# Patient Record
Sex: MALE | Race: ASIAN | Employment: UNEMPLOYED | URBAN - METROPOLITAN AREA
[De-identification: names, ages, dates, MRNs, and addresses within clinical notes are randomized per-mention and may not be internally consistent; named-entity substitution may affect disease eponyms.]

---
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
## 2021-10-22 ENCOUNTER — OFFICE VISIT (OUTPATIENT)
Dept: PRIMARY CARE CLINIC | Age: 21
End: 2021-10-22
Payer: COMMERCIAL

## 2021-10-22 VITALS
WEIGHT: 94.8 LBS | BODY MASS INDEX: 16.18 KG/M2 | SYSTOLIC BLOOD PRESSURE: 120 MMHG | HEART RATE: 88 BPM | DIASTOLIC BLOOD PRESSURE: 66 MMHG | HEIGHT: 64 IN | OXYGEN SATURATION: 100 %

## 2021-10-22 DIAGNOSIS — G89.29 CHRONIC RLQ PAIN: Primary | ICD-10-CM

## 2021-10-22 DIAGNOSIS — R10.31 CHRONIC RLQ PAIN: Primary | ICD-10-CM

## 2021-10-22 PROCEDURE — 99214 OFFICE O/P EST MOD 30 MIN: CPT | Performed by: PHYSICIAN ASSISTANT

## 2021-10-22 SDOH — ECONOMIC STABILITY: FOOD INSECURITY: WITHIN THE PAST 12 MONTHS, YOU WORRIED THAT YOUR FOOD WOULD RUN OUT BEFORE YOU GOT MONEY TO BUY MORE.: NEVER TRUE

## 2021-10-22 SDOH — ECONOMIC STABILITY: FOOD INSECURITY: WITHIN THE PAST 12 MONTHS, THE FOOD YOU BOUGHT JUST DIDN'T LAST AND YOU DIDN'T HAVE MONEY TO GET MORE.: NEVER TRUE

## 2021-10-22 ASSESSMENT — ENCOUNTER SYMPTOMS
NAUSEA: 0
BLOOD IN STOOL: 0
RESPIRATORY NEGATIVE: 1
ABDOMINAL PAIN: 1
CONSTIPATION: 0
ANAL BLEEDING: 0
ABDOMINAL DISTENTION: 0
VOMITING: 0
DIARRHEA: 0

## 2021-10-22 ASSESSMENT — PATIENT HEALTH QUESTIONNAIRE - PHQ9
1. LITTLE INTEREST OR PLEASURE IN DOING THINGS: 0
SUM OF ALL RESPONSES TO PHQ QUESTIONS 1-9: 0
SUM OF ALL RESPONSES TO PHQ9 QUESTIONS 1 & 2: 0
2. FEELING DOWN, DEPRESSED OR HOPELESS: 0

## 2021-10-22 ASSESSMENT — SOCIAL DETERMINANTS OF HEALTH (SDOH): HOW HARD IS IT FOR YOU TO PAY FOR THE VERY BASICS LIKE FOOD, HOUSING, MEDICAL CARE, AND HEATING?: NOT HARD AT ALL

## 2021-10-22 NOTE — PROGRESS NOTES
[facility]  [address] [place] [id number]  Phone: [phone]  Fax: [phone]    Laurie Clemente is a 22 y.o. male who presents today for his medical conditions/complaintsas noted below. Chief Complaint   Patient presents with    Abdominal Pain     RLQ abdominal pain x 2 weeks. Pain is intermittent. Patient notices pain when he moves to stand up.  Eye Problem     Patient complains of left eye yellow spot. No pain or discharge          HPI:     HPI  Off and on abdominal pain for 2 weeks. Slowly getting better. Has not had N/V/fever, constipation/diarrhea/rash. Has had loud bowel sounds. Last BM was normal today   No current outpatient medications on file. No current facility-administered medications for this visit. No Known Allergies    Subjective:      Review of Systems   Constitutional: Negative for chills, diaphoresis and fever. Eyes:        Has a yellow spot in eye   Respiratory: Negative. Cardiovascular: Negative. Gastrointestinal: Positive for abdominal pain (RLQ). Negative for abdominal distention, anal bleeding, blood in stool, constipation, diarrhea, nausea and vomiting. Genitourinary: Negative for hematuria. Skin: Negative for rash. Objective:     /66   Pulse 88   Ht 5' 3.5\" (1.613 m)   Wt 94 lb 12.8 oz (43 kg)   SpO2 100%   BMI 16.53 kg/m²   Physical Exam  Vitals and nursing note reviewed. Constitutional:       Appearance: Normal appearance. He is not ill-appearing. Comments: Patient is underweight   Eyes:      Extraocular Movements:      Right eye: Nystagmus present. Left eye: Nystagmus present. Comments: Light brown irregularly shaped macule on sclera   Cardiovascular:      Rate and Rhythm: Normal rate and regular rhythm. Heart sounds: Normal heart sounds. Pulmonary:      Effort: Pulmonary effort is normal.      Breath sounds: Normal breath sounds. No wheezing, rhonchi or rales. Abdominal:      General: Abdomen is flat. Bowel sounds are increased. Palpations: There is no fluid wave, hepatomegaly, splenomegaly or mass. Tenderness: There is no abdominal tenderness. There is no guarding or rebound. Hernia: No hernia is present. Neurological:      Mental Status: He is alert. Assessment:       Diagnosis Orders   1. Chronic RLQ pain  CT ABDOMEN PELVIS W IV CONTRAST    BUN    Creatinine        Plan:     See eye doctor about light brown spot on inner left eye  CT scan for abdominal pain  Tylenol for pain, if needed  If any N/V, fever go to ER. Return if symptoms worsen or fail to improve. Orders Placed This Encounter   Procedures    CT ABDOMEN PELVIS W IV CONTRAST     Standing Status:   Future     Standing Expiration Date:   10/22/2022    BUN     Standing Status:   Future     Standing Expiration Date:   10/22/2022    Creatinine     Standing Status:   Future     Standing Expiration Date:   10/22/2022     No orders of the defined types were placed in this encounter.           Electronically signed by Rod Nyhan, PA-Con 10/22/2021 at 3:26 PM

## 2021-11-13 ENCOUNTER — HOSPITAL ENCOUNTER (OUTPATIENT)
Dept: CT IMAGING | Age: 21
Discharge: HOME | End: 2021-11-15
Payer: COMMERCIAL

## 2021-11-13 DIAGNOSIS — G89.29 CHRONIC RLQ PAIN: ICD-10-CM

## 2021-11-13 DIAGNOSIS — R10.31 CHRONIC RLQ PAIN: ICD-10-CM

## 2021-11-13 PROCEDURE — 6360000004 HC RX CONTRAST MEDICATION: Performed by: PHYSICIAN ASSISTANT

## 2021-11-13 PROCEDURE — 2580000003 HC RX 258: Performed by: PHYSICIAN ASSISTANT

## 2021-11-13 PROCEDURE — 74177 CT ABD & PELVIS W/CONTRAST: CPT

## 2021-11-13 RX ORDER — 0.9 % SODIUM CHLORIDE 0.9 %
80 INTRAVENOUS SOLUTION INTRAVENOUS ONCE
Status: COMPLETED | OUTPATIENT
Start: 2021-11-13 | End: 2021-11-13

## 2021-11-13 RX ORDER — SODIUM CHLORIDE 0.9 % (FLUSH) 0.9 %
10 SYRINGE (ML) INJECTION PRN
Status: DISCONTINUED | OUTPATIENT
Start: 2021-11-13 | End: 2021-11-16 | Stop reason: HOSPADM

## 2021-11-13 RX ADMIN — IOHEXOL 50 ML: 240 INJECTION, SOLUTION INTRATHECAL; INTRAVASCULAR; INTRAVENOUS; ORAL at 14:25

## 2021-11-13 RX ADMIN — IOPAMIDOL 75 ML: 755 INJECTION, SOLUTION INTRAVENOUS at 14:26

## 2021-11-13 RX ADMIN — SODIUM CHLORIDE 80 ML: 9 INJECTION, SOLUTION INTRAVENOUS at 14:26

## 2021-11-13 RX ADMIN — SODIUM CHLORIDE, PRESERVATIVE FREE 10 ML: 5 INJECTION INTRAVENOUS at 14:26

## 2021-11-16 ENCOUNTER — OFFICE VISIT (OUTPATIENT)
Dept: PRIMARY CARE CLINIC | Age: 21
End: 2021-11-16
Payer: COMMERCIAL

## 2021-11-16 VITALS
HEIGHT: 64 IN | DIASTOLIC BLOOD PRESSURE: 70 MMHG | SYSTOLIC BLOOD PRESSURE: 100 MMHG | OXYGEN SATURATION: 98 % | WEIGHT: 96.6 LBS | BODY MASS INDEX: 16.49 KG/M2 | HEART RATE: 72 BPM

## 2021-11-16 DIAGNOSIS — K59.00 CONSTIPATION, UNSPECIFIED CONSTIPATION TYPE: Primary | ICD-10-CM

## 2021-11-16 PROCEDURE — 99213 OFFICE O/P EST LOW 20 MIN: CPT | Performed by: PHYSICIAN ASSISTANT

## 2021-11-16 ASSESSMENT — ENCOUNTER SYMPTOMS
COUGH: 0
ABDOMINAL PAIN: 0
DIARRHEA: 0
RHINORRHEA: 0
SINUS PRESSURE: 0
PHOTOPHOBIA: 0
CONSTIPATION: 0
VOMITING: 0
ABDOMINAL DISTENTION: 0
EYE DISCHARGE: 0
SORE THROAT: 0
SHORTNESS OF BREATH: 0
CHEST TIGHTNESS: 0

## 2021-11-16 NOTE — PROGRESS NOTES
[address] [place] [address] PRIMARY CARE  [address] [facility] [address]  Dept: [phone]    Daniel Pastrana is a 22 y.o. male Established patient, who presents today for his medical conditions/complaints as noted below. Chief Complaint   Patient presents with    Follow-up     f/u CT scan       HPI:     HPI: The patient has been having abdominal pain for months. Had a CT completed which was normal. Put ointment on abd which did not help. Last BM was this morning. They are soft and formed. Pain on right lower side. Reviewed prior notes None  Reviewed previous Labs    No results found for: LDLCHOLESTEROL, LDLCALC    (goal LDL is <100)   No results found for: AST, ALT, BUN, LABA1C, TSH  BP Readings from Last 3 Encounters:   11/16/21 100/70   10/22/21 120/66   09/18/20 100/60          (goal 120/80)    Past Medical History:   Diagnosis Date    Prematurity     Short gut syndrome       Past Surgical History:   Procedure Laterality Date    SMALL INTESTINE SURGERY      Had to have five surgeries        History reviewed. No pertinent family history. Social History     Tobacco Use    Smoking status: Never Smoker    Smokeless tobacco: Never Used   Substance Use Topics    Alcohol use: Never      Current Outpatient Medications   Medication Sig Dispense Refill    magnesium citrate solution Take 148 mLs by mouth once for 1 dose 148 mL 0     No current facility-administered medications for this visit.      No Known Allergies    Health Maintenance   Topic Date Due    Hepatitis C screen  Never done    Varicella vaccine (1 of 2 - 2-dose childhood series) Never done    Hepatitis B vaccine (2 of 3 - 3-dose primary series) 04/01/2002    HPV vaccine (1 - Male 2-dose series) Never done    HIV screen  Never done    Flu vaccine (1) 11/16/2022 (Originally 9/1/2021)    DTaP/Tdap/Td vaccine (3 - Td or Tdap) 03/27/2029 (Originally 3/14/2024)    Hib vaccine  Completed    Meningococcal bruit. Cardiovascular:      Rate and Rhythm: Normal rate and regular rhythm. Pulses: Normal pulses. Heart sounds: Normal heart sounds. Pulmonary:      Effort: Pulmonary effort is normal. No respiratory distress. Breath sounds: Normal breath sounds. Abdominal:      General: Bowel sounds are normal. There is no distension. Tenderness: There is no abdominal tenderness. Musculoskeletal:         General: Normal range of motion. Cervical back: Normal range of motion and neck supple. Lymphadenopathy:      Cervical: No cervical adenopathy. Skin:     General: Skin is warm and dry. Neurological:      General: No focal deficit present. Mental Status: He is alert and oriented to person, place, and time. Psychiatric:         Mood and Affect: Mood normal.         Behavior: Behavior normal.         Thought Content: Thought content normal.         Assessment and Plan:          1. Constipation, unspecified constipation type  -     magnesium citrate solution; Take 148 mLs by mouth once for 1 dose, Disp-148 mL, R-0Normal             Patient given educational materials - see patient instructions. Discussed use, benefit, and side effects of prescribed medications. All patient questions answered. Pt voiced understanding. Reviewed health maintenance. Instructed to continue current medications, diet and exercise. Patient agreed with treatment plan. Follow up as directed.      Electronically signed by GEORGE Eldridge on 11/16/2021 at 11:12 AM

## 2022-01-04 ENCOUNTER — TELEPHONE (OUTPATIENT)
Dept: PRIMARY CARE CLINIC | Age: 22
End: 2022-01-04

## 2022-01-04 NOTE — TELEPHONE ENCOUNTER
Pts dad called in. Pt has a cough and his dad tested positive for covid, he was asking if pt should get tested as well. Ok to schedule for covid swab [place]?

## 2022-01-04 NOTE — TELEPHONE ENCOUNTER
Yes, ok to have [place] for covid swab or they can go to [facility]

## 2022-01-05 ENCOUNTER — NURSE ONLY (OUTPATIENT)
Dept: PRIMARY CARE CLINIC | Age: 22
End: 2022-01-05

## 2022-01-05 ENCOUNTER — HOSPITAL ENCOUNTER (OUTPATIENT)
Age: 22
Setting detail: SPECIMEN
Discharge: HOME | End: 2022-01-05

## 2022-01-05 DIAGNOSIS — Z11.52 ENCOUNTER FOR SCREENING FOR COVID-19: Primary | ICD-10-CM

## 2022-01-05 NOTE — PROGRESS NOTES
Per orders from Dr Larry Larose, patient came in for a [place] to get a covid swab. Patient c/o headaches, fatigue and body aches x 1 day. Covid swab was obtained and patient tolerated well. Patient was informed that we will call him once we get the results. Verbal understanding per patient.

## 2022-01-06 DIAGNOSIS — Z11.52 ENCOUNTER FOR SCREENING FOR COVID-19: ICD-10-CM

## 2022-01-06 LAB
SARS-COV-2: ABNORMAL
SARS-COV-2: DETECTED
SOURCE: ABNORMAL

## 2023-05-12 ENCOUNTER — HOSPITAL ENCOUNTER (OUTPATIENT)
Age: 23
Discharge: HOME | End: 2023-05-12
Payer: COMMERCIAL

## 2023-05-12 ENCOUNTER — HOSPITAL ENCOUNTER (OUTPATIENT)
Dept: GENERAL RADIOLOGY | Age: 23
End: 2023-05-12
Payer: COMMERCIAL

## 2023-05-12 ENCOUNTER — OFFICE VISIT (OUTPATIENT)
Dept: PRIMARY CARE CLINIC | Age: 23
End: 2023-05-12
Payer: COMMERCIAL

## 2023-05-12 VITALS
TEMPERATURE: 98 F | SYSTOLIC BLOOD PRESSURE: 110 MMHG | HEIGHT: 64 IN | OXYGEN SATURATION: 98 % | WEIGHT: 95 LBS | DIASTOLIC BLOOD PRESSURE: 68 MMHG | BODY MASS INDEX: 16.22 KG/M2 | HEART RATE: 76 BPM

## 2023-05-12 DIAGNOSIS — R10.31 RIGHT LOWER QUADRANT ABDOMINAL PAIN: Primary | ICD-10-CM

## 2023-05-12 DIAGNOSIS — R10.31 RIGHT LOWER QUADRANT ABDOMINAL PAIN: ICD-10-CM

## 2023-05-12 PROCEDURE — 99213 OFFICE O/P EST LOW 20 MIN: CPT | Performed by: PHYSICIAN ASSISTANT

## 2023-05-12 PROCEDURE — 74019 RADEX ABDOMEN 2 VIEWS: CPT

## 2023-05-12 ASSESSMENT — PATIENT HEALTH QUESTIONNAIRE - PHQ9
SUM OF ALL RESPONSES TO PHQ QUESTIONS 1-9: 0
SUM OF ALL RESPONSES TO PHQ QUESTIONS 1-9: 0
SUM OF ALL RESPONSES TO PHQ9 QUESTIONS 1 & 2: 0
SUM OF ALL RESPONSES TO PHQ QUESTIONS 1-9: 0
2. FEELING DOWN, DEPRESSED OR HOPELESS: 0
SUM OF ALL RESPONSES TO PHQ QUESTIONS 1-9: 0
1. LITTLE INTEREST OR PLEASURE IN DOING THINGS: 0

## 2023-05-12 ASSESSMENT — ENCOUNTER SYMPTOMS
ABDOMINAL DISTENTION: 0
VOMITING: 0
NAUSEA: 0
BACK PAIN: 0
BLOOD IN STOOL: 0
ANAL BLEEDING: 0
SHORTNESS OF BREATH: 0
DIARRHEA: 0
ABDOMINAL PAIN: 1
CONSTIPATION: 0

## 2023-05-12 NOTE — PROGRESS NOTES
sign and McBurney's sign. Hernia: No hernia is present. Neurological:      Mental Status: He is alert. Assessment:       Diagnosis Orders   1. Right lower quadrant abdominal pain  XR ABDOMEN (2 VIEWS)           Plan:    XR to r/o constipation  Did advise that if fever or vomiting starts, he should go to ER  Return if symptoms worsen or fail to improve. Orders Placed This Encounter   Procedures    XR ABDOMEN (2 VIEWS)     Flat plate and upright series     Standing Status:   Future     Standing Expiration Date:   5/11/2024     No orders of the defined types were placed in this encounter.           Electronically signed by Srinivas Garcia 5/12/2023 at 8:40 AM

## 2023-09-06 ENCOUNTER — OFFICE VISIT (OUTPATIENT)
Dept: PRIMARY CARE CLINIC | Age: 23
End: 2023-09-06
Payer: COMMERCIAL

## 2023-09-06 VITALS
SYSTOLIC BLOOD PRESSURE: 114 MMHG | OXYGEN SATURATION: 100 % | DIASTOLIC BLOOD PRESSURE: 80 MMHG | WEIGHT: 94 LBS | HEIGHT: 63 IN | HEART RATE: 73 BPM | BODY MASS INDEX: 16.66 KG/M2

## 2023-09-06 DIAGNOSIS — R20.9 SKIN SENSATION DISTURBANCE: Primary | ICD-10-CM

## 2023-09-06 PROCEDURE — 99213 OFFICE O/P EST LOW 20 MIN: CPT | Performed by: PHYSICIAN ASSISTANT

## 2023-09-06 SDOH — ECONOMIC STABILITY: FOOD INSECURITY: WITHIN THE PAST 12 MONTHS, YOU WORRIED THAT YOUR FOOD WOULD RUN OUT BEFORE YOU GOT MONEY TO BUY MORE.: NEVER TRUE

## 2023-09-06 SDOH — ECONOMIC STABILITY: FOOD INSECURITY: WITHIN THE PAST 12 MONTHS, THE FOOD YOU BOUGHT JUST DIDN'T LAST AND YOU DIDN'T HAVE MONEY TO GET MORE.: NEVER TRUE

## 2023-09-06 SDOH — ECONOMIC STABILITY: INCOME INSECURITY: HOW HARD IS IT FOR YOU TO PAY FOR THE VERY BASICS LIKE FOOD, HOUSING, MEDICAL CARE, AND HEATING?: NOT VERY HARD

## 2023-09-06 SDOH — ECONOMIC STABILITY: HOUSING INSECURITY
IN THE LAST 12 MONTHS, WAS THERE A TIME WHEN YOU DID NOT HAVE A STEADY PLACE TO SLEEP OR SLEPT IN A SHELTER (INCLUDING NOW)?: NO

## 2023-09-06 ASSESSMENT — ENCOUNTER SYMPTOMS
CHEST TIGHTNESS: 0
COUGH: 0
SHORTNESS OF BREATH: 0
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
## 2024-09-04 ENCOUNTER — OFFICE VISIT (OUTPATIENT)
Dept: PRIMARY CARE CLINIC | Age: 24
End: 2024-09-04

## 2024-09-04 VITALS
WEIGHT: 97.2 LBS | DIASTOLIC BLOOD PRESSURE: 68 MMHG | HEIGHT: 63 IN | SYSTOLIC BLOOD PRESSURE: 100 MMHG | BODY MASS INDEX: 17.22 KG/M2 | HEART RATE: 80 BPM | OXYGEN SATURATION: 99 %

## 2024-09-04 DIAGNOSIS — L72.3 INFLAMED SEBACEOUS CYST: Primary | ICD-10-CM

## 2024-09-04 PROCEDURE — 99213 OFFICE O/P EST LOW 20 MIN: CPT | Performed by: PHYSICIAN ASSISTANT

## 2024-09-04 ASSESSMENT — ENCOUNTER SYMPTOMS
CHEST TIGHTNESS: 0
COUGH: 0
ABDOMINAL PAIN: 0
SORE THROAT: 0
CONSTIPATION: 0
ABDOMINAL DISTENTION: 0
DIARRHEA: 0
SHORTNESS OF BREATH: 0

## 2024-09-04 ASSESSMENT — PATIENT HEALTH QUESTIONNAIRE - PHQ9
SUM OF ALL RESPONSES TO PHQ QUESTIONS 1-9: 0
SUM OF ALL RESPONSES TO PHQ QUESTIONS 1-9: 0
SUM OF ALL RESPONSES TO PHQ9 QUESTIONS 1 & 2: 0
1. LITTLE INTEREST OR PLEASURE IN DOING THINGS: NOT AT ALL
2. FEELING DOWN, DEPRESSED OR HOPELESS: NOT AT ALL
SUM OF ALL RESPONSES TO PHQ QUESTIONS 1-9: 0
SUM OF ALL RESPONSES TO PHQ QUESTIONS 1-9: 0

## 2024-09-04 NOTE — PROGRESS NOTES
Skin:     General: Skin is warm and dry.      Findings: Lesion present. No erythema.   Neurological:      Mental Status: He is alert and oriented to person, place, and time.   Psychiatric:         Mood and Affect: Mood normal.         Behavior: Behavior normal.         Thought Content: Thought content normal.         Assessment and Plan:     Assessment & Plan     1. Inflamed sebaceous cyst  -     Derek Maloney MD, General Surgery, [place]     20 min spent going over this diagnosis and treatment with patient.           No follow-ups on file.     Patient given educational materials - see patient instructions.  Discussed use, benefit, and side effects of prescribed medications.  All patient questions answered. Pt voiced understanding. Reviewed health maintenance.  Instructed to continue current medications, diet and exercise.  Patient agreed with treatment plan. Follow up as directed.     Electronically signed by GEORGE Matthew on 9/4/2024 at 8:20 AM

## 2024-09-29 NOTE — PROGRESS NOTES
risk-benefit alternatives, recovery restrictions were all discussed with the patient at length.  Pre-operative, intra-operative, post-operative details regarding procedure discussed with patient and all questions were answered. Patient understands and wants to proceed.    ENCOUNTER DIAGNOSES    ICD-10-CM    1. Epidermal cyst  L72.0           Return for Post Op surgery.    The patient, Tom Adams is a 24 y.o. male, was seen with a total time spent of 35 minutes for the visit on this date of service by the E/M provider. The time component had both face to face and non face to face time spent in determining the total time component.  Counseling and education regarding the patient's diagnosis listed below and the patient's options regarding those diagnoses were also included in determining the time component.      Electronically signed by CORETTA Chambers CNP  on 10/3/2024 at 2:34 PM

## 2024-09-30 ENCOUNTER — OFFICE VISIT (OUTPATIENT)
Age: 24
End: 2024-09-30
Payer: COMMERCIAL

## 2024-09-30 VITALS
WEIGHT: 96.6 LBS | HEIGHT: 65 IN | HEART RATE: 71 BPM | BODY MASS INDEX: 16.1 KG/M2 | TEMPERATURE: 98.6 F | SYSTOLIC BLOOD PRESSURE: 124 MMHG | DIASTOLIC BLOOD PRESSURE: 81 MMHG

## 2024-09-30 DIAGNOSIS — L72.0 EPIDERMAL CYST: Primary | ICD-10-CM

## 2024-09-30 PROCEDURE — 99203 OFFICE O/P NEW LOW 30 MIN: CPT | Performed by: NURSE PRACTITIONER

## 2024-09-30 NOTE — PATIENT INSTRUCTIONS
[facility] GENERAL SURGERY   [id number]  [facility] [address]  Dept: [phone]  Dept Fax: [phone]          SURGICAL REQUIREMENTS :    - If you have any coverage / billing questions before your surgery please contact your insurance directly.   - As discussed with the surgery scheduler and/ or your provider, surgical clearances may be required for your surgical procedure. We will let you know if this is needed at the time of your consult.  -Pre- admission testing is required for all surgeries either by phone or in person depending on the type of surgery you are having.   - Please note pre-admission testing may require surgical clearance per their guidelines.  -Our surgery schedulers will schedule this for you and provide you with the date, time and if it is in person or over the phone.   -Our surgery schedulers will also provide you with an appointment date and time for follow up in our office 2-3 weeks after your procedure.     -If surgical clearance is required, a letter requesting this will be faxed to your provider(s).   Please follow up with your provider(s) to schedule the necessary appointments needed to obtain the clearance.    -Please note a clearance can be required through pre-admission testing or any provider that takes part in your care. Required clearances not obtained prior to surgery may result in cancellation or rescheduling of your surgery.    -Any FMLA, short term disability paperwork or work notes needed will not be completed until the day of your scheduled surgery, please allow 24-48 hours for these to be completed and signed by provider.     [facility].

## 2024-10-07 ENCOUNTER — TELEPHONE (OUTPATIENT)
Age: 24
End: 2024-10-07

## 2024-10-07 NOTE — TELEPHONE ENCOUNTER
Patient called to schedule procedure  Pre op instructions discussed and will be mailed to patient    EM/SC/10-28-24@2:30am/Excision of Sebaceous Cyst Left Foream/[place]               PRE SURGERY INSTRUCTIONS    STOP ASPIRIN 7 DAYS PRIOR TO PROCEDURE    STOP ALL OTHER BLOOD THINNERS 5 DAYS PRIOR TO PROCEDURE    NOTHING TO EAT, DRINK, SMOKE, OR CHEW AFTER MIDNIGHT  You may brush your teeth, rinse gargle, and spit  Heart or blood pressure medication ONLY with a  small sip of water, unless otherwise directed  Shower with regular soap and water    YOU MUST HAVE AN ADULT EITHER DRIVE YOU OR RIDE IN A CAB WITH YOU    MY PROCEDURE IS SCHEDULED FOR:  Excision of Sebaceous Cyst Left Forearm    Pre-Admission Testing Phone Assessment:10/14/24 at 11:00am    Date of Procedure: 10/28/24    Time: 2:30pm    Arrival Time: 12:30pm    Location:  [facility]     Follow Up Appointment at [facility] Dr Castro:  11/13/24 at 10:15am

## 2024-10-14 ENCOUNTER — HOSPITAL ENCOUNTER (OUTPATIENT)
Dept: PREADMISSION TESTING | Age: 24
Discharge: HOME | End: 2024-10-18

## 2024-10-14 VITALS — HEIGHT: 65 IN | WEIGHT: 96 LBS | BODY MASS INDEX: 15.99 KG/M2

## 2024-10-14 NOTE — PROGRESS NOTES
Pre-op Instructions For Out-Patient Surgery (LOCAL)    Medication Instructions:  Please stop herbs and any supplements now (includes vitamins and minerals).    Please contact your surgeon and prescribing physician for pre-op instructions for any blood thinners.    If you have inhalers/aerosol treatments at home, please use them the morning of your surgery and bring the inhalers with you to the hospital.    Please take the following medications the morning of your surgery with a sip of water:    None    Surgery Instructions:  After midnight before surgery:  Do not eat or drink anything, including water, mints, gum, and hard candy.  You may brush your teeth without swallowing.  No smoking, chewing tobacco, or street drugs.    Please shower or bathe before surgery.  If you were given Surgical Scrub Chlorhexidine Gluconate Liquid (CHG), please shower the night before and the morning of your surgery following the detailed instructions you received during your pre-admission visit.     Please do not wear any cologne, lotion, powder, deodorant, jewelry, piercings, perfume, makeup, nail polish, hair accessories, or hair spray on the day of surgery.  Wear loose comfortable clothing.    Leave your valuables at home but bring a payment source for any after-surgery prescriptions you plan to fill at [facility].  Bring a storage case for any glasses/contacts.    An adult who is responsible for you MUST drive you home and should be with you for the first 24 hours after surgery.     If having out-patient knee and foot surgeries, please arrange for planned crutches, walker, or wheelchair before arriving to the hospital.    The Day of Surgery:  Arrive at [facility] Surgery Entrance at the time directed by your surgeon and check in at the desk.     If you have a living will or healthcare power of , please bring a copy.    You will be taken to the pre-op holding area where you

## 2024-10-25 NOTE — PRE-PROCEDURE INSTRUCTIONS
Nothing to eat after midnight.  Are you taking any blood thinners? When was the last day?  Make sure to use Hibiclens prior to surgery.  Remove any jewelry and body piercings.  Do you wear glasses? If so, please bring a case to store them in.  Are you having any Covid symptoms?  Do you have any new rashes, infections, etc. that we should be aware of?  Do you have a ride home the day of surgery? It cannot be a cab or medical transportation.  Verify surgery time and what time to arrive at hospital.  NO ANSWER, VM LEFT

## 2024-10-28 ENCOUNTER — HOSPITAL ENCOUNTER (OUTPATIENT)
Age: 24
Setting detail: OUTPATIENT SURGERY
Discharge: HOME | End: 2024-10-28
Attending: SURGERY | Admitting: SURGERY
Payer: COMMERCIAL

## 2024-10-28 VITALS
RESPIRATION RATE: 14 BRPM | HEIGHT: 65 IN | OXYGEN SATURATION: 99 % | WEIGHT: 96 LBS | HEART RATE: 75 BPM | TEMPERATURE: 97.7 F | SYSTOLIC BLOOD PRESSURE: 128 MMHG | DIASTOLIC BLOOD PRESSURE: 72 MMHG | BODY MASS INDEX: 15.99 KG/M2

## 2024-10-28 DIAGNOSIS — L72.0 EPIDERMAL CYST: ICD-10-CM

## 2024-10-28 PROCEDURE — 11402 EXC TR-EXT B9+MARG 1.1-2 CM: CPT | Performed by: SURGERY

## 2024-10-28 PROCEDURE — 2709999900 HC NON-CHARGEABLE SUPPLY: Performed by: SURGERY

## 2024-10-28 PROCEDURE — 6360000002 HC RX W HCPCS: Performed by: SURGERY

## 2024-10-28 PROCEDURE — 7100000010 HC PHASE II RECOVERY - FIRST 15 MIN: Performed by: SURGERY

## 2024-10-28 PROCEDURE — 3600000012 HC SURGERY LEVEL 2 ADDTL 15MIN: Performed by: SURGERY

## 2024-10-28 PROCEDURE — 88305 TISSUE EXAM BY PATHOLOGIST: CPT

## 2024-10-28 PROCEDURE — 3600000002 HC SURGERY LEVEL 2 BASE: Performed by: SURGERY

## 2024-10-28 PROCEDURE — NBSRV NON-BILLABLE SERVICE: Performed by: NURSE PRACTITIONER

## 2024-10-28 RX ORDER — BUPIVACAINE HYDROCHLORIDE 5 MG/ML
INJECTION, SOLUTION EPIDURAL; INTRACAUDAL PRN
Status: DISCONTINUED | OUTPATIENT
Start: 2024-10-28 | End: 2024-10-28 | Stop reason: ALTCHOICE

## 2024-10-28 RX ORDER — CEPHALEXIN 500 MG/1
CAPSULE ORAL
Qty: 21 CAPSULE | Refills: 0 | Status: SHIPPED | OUTPATIENT
Start: 2024-10-28

## 2024-10-28 ASSESSMENT — PAIN - FUNCTIONAL ASSESSMENT
PAIN_FUNCTIONAL_ASSESSMENT: 0-10
PAIN_FUNCTIONAL_ASSESSMENT: NONE - DENIES PAIN

## 2024-10-28 ASSESSMENT — ENCOUNTER SYMPTOMS
TROUBLE SWALLOWING: 0
SHORTNESS OF BREATH: 0
COUGH: 0
ROS SKIN COMMENTS: SEE HPI
GASTROINTESTINAL NEGATIVE: 1
APNEA: 0
ABDOMINAL PAIN: 0
BACK PAIN: 0
SORE THROAT: 0

## 2024-10-28 NOTE — PROGRESS NOTES
[facility] Surgery   Derek Castro MD, [facility]  RADAMES Marcelino  [address], [address]  [place] [address]  P: [phone], F: [phone]    First Assist Operative Note      Patient: Tom Adams  YOB: 2000  MRN: [id number]    Date of Procedure: 10/28/2024    Pre-Op Diagnosis Codes:   Symptomatic cyst left posterior forearm     Post-Op Diagnosis: Same       Procedure(s): Excision symptomatic cyst left posterior forearm 2 x 1 cm     Surgeon(s):  Derek Castro MD    Assistant: CORETTA Chambers CNP was needed throughout the entirety of case for helped with positioning the patient, instrumentation and exposure and wound closure.     Anesthesia: Local    Detailed Description of Procedure: See Dr. Castro's operative note from 10/28/2024 for further detail.    Electronically signed by CORETTA Chambers CNP on 10/28/2024 at 4:22 PM     Class III - visualization of the soft palate and the base of the uvula

## 2024-10-28 NOTE — H&P
HISTORY and PHYSICAL  [facility]       NAME:  Tom Adams  MRN: [id number]   YOB: 2000   Date: 10/28/2024   Age: 24 y.o.  Gender: male       COMPLAINT AND PRESENT HISTORY:     Tom Adams is 24 y.o.,  male, presents for EXCISION EPIDERMAL CYST FOREARM LEFT   Primary dx: Epidermal cyst [L72.0].    Office note per CORETTA Bliss on 9/30/2024  HISTORY OF PRESENT ILLNESS: 24 y.o. male presents with lump to left arm.  Lump:  Site: Left forearm  Duration: 1 month  Pain: No  Erythema: Some  Drainage: Denies  Size:  Same  Treatment in the Past: yes - Epsom salt, no ATB's  Prior Surgery: no  Denies personal/family hx of skin CA.     Major medical hx: None  Blood thinning medications: None    UPDATE 10/28/2024    Tom Adams is 24 y.o.,  male, notice she/he has a lump on his left forearm.   Pt states the size of the lump did not grow in size, mildly tender with palpation, no drainage, did not cause any skin irritation or redness    Pt denies fever/chills, chest pain or SOB    Review of additional significant medical hx  (See chart for additional detail, including current medications /see ROS for current S/S):     NPO status: NPO  Medications taken TODAY (with sip of water): None  Anticoagulation status: None    Denies personal hx of blood clots.  Denies personal hx of MRSA infection.  Denies any personal or family hx of previous complications w/anesthesia.    PAST MEDICAL HISTORY     Past Medical History:   Diagnosis Date    Prematurity     Short gut syndrome        SURGICAL HISTORY       Past Surgical History:   Procedure Laterality Date    SMALL INTESTINE SURGERY      Had to have five surgeries        FAMILY HISTORY     No family history on file.    SOCIAL HISTORY       Social History     Socioeconomic History    Marital status: Single   Tobacco Use    Smoking status: Never    Smokeless tobacco: Never   Vaping Use    Vaping status: Never Used   Substance and Sexual Activity

## 2024-10-28 NOTE — OP NOTE
[facility] General Surgery   Derek Castro MD, FACS  Felicita Guy, APRN-CNP  [address], [address]  [place] [address]  P: [phone], F: [phone]      Preoperative diagnosis: Symptomatic cyst left posterior forearm    Postoperative diagnosis: Same    Procedure: Excision symptomatic cyst left posterior forearm 2 x 1 cm    Surgeon: Dr. Castro    First Assist: CORETTA Marcelino-CNP    Anesthesia: Local    Preparation: Chloraprep    EBL: Less than 10 mL    Specimen: Cyst    Procedure: Informed consent was obtained.  Site was marked and confirmed.  Patient was taken to the operating room.  Vital signs were monitored remained stable throughout the procedure.  Operative site was prepped and draped in usual sterile fashion.  Timeout was done.  Local anesthetic was infiltrated at the marked site.  Incision was made using a #15 blade.  Dissection was carried out.  Approximately 2 x 1 cm cyst was excised.  Specimen was labeled and sent to pathology.  Wound was explored.  Hemostasis was confirmed.  Sponge needle instrument counts were to be correct.  Wound was approximated using absorbable sutures.  Site was cleaned.  Dermabond was applied.  Steri-Strips were applied.  Sterile dressing was applied.  Patient tolerated procedure well and was transferred to the recovery room in a stable condition.    First assistant helped with positioning the patient, instrumentation and exposure and wound closure.    Recommendations: Operative findings discussed with the patient.  Postoperative course recovery restrictions follow-up were all discussed.  Prescription called in.  Discharge instructions in the chart.

## 2024-10-28 NOTE — PROGRESS NOTES
CLINICAL PHARMACY NOTE: MEDS TO BEDS    Total # of Prescriptions Filled: 1   The following medications were delivered to the patient:  Cephalexin 500mg    Additional Documentation: 10/28/24 pt themself and family p/u at [facility] 4:37pm darlene

## 2024-10-28 NOTE — DISCHARGE INSTRUCTIONS
DISCHARGE INSTRUCTIONS FOR OUTPATIENT SURGERY    In order to continue your care at home, please follow the instructions below.    Medications  Take medications as instructed by your surgeon.   Please do not take prescribed pain medication with alcoholic beverages.  When cleared to drive by your surgeon, please do not drive or operate machinery while taking any prescribed pain medication.    Activities  As instructed by your surgeon.  Limit your activities for 24 hours.  Avoid heavy work or sports until surgeon approves.   No lifting, pushing, pulling, straining until approved by your surgeon and not more than 10 pounds.  No driving or operating machinery until cleared by surgeon.  May shower tomorrow if incision was closed with surgical glue, otherwise may shower after dressings are removed.    Surgery Area  Always keep dressings/incisions clean and dry  If covered with a dressing, you may remove it in 48 hours unless instructed otherwise by your surgeon.  Apply ice pack 20-30 min 4 times a day for 48 hours to help decrease swelling and pain.  Make sure to have a piece of cloth between the ice bag and your skin.    Call your surgeon for the following:  You have pain that does not get better after you take pain medicine.   For an oral temperature (by mouth) is 101 degrees or higher, chills, or excessive sweating.  You have increasing and progressive bleeding or drainage from surgery site.  Signs of an infection:  increased swelling, redness, warmth, or hardness around surgery area or yellow or green drainage.  Redness or swelling at IV site.  For any questions or concerns you may have.

## 2024-10-30 LAB — SURGICAL PATHOLOGY REPORT: NORMAL

## 2024-11-13 ENCOUNTER — OFFICE VISIT (OUTPATIENT)
Age: 24
End: 2024-11-13
Payer: COMMERCIAL

## 2024-11-13 VITALS
HEART RATE: 83 BPM | SYSTOLIC BLOOD PRESSURE: 106 MMHG | WEIGHT: 96.8 LBS | TEMPERATURE: 99 F | DIASTOLIC BLOOD PRESSURE: 73 MMHG | OXYGEN SATURATION: 97 % | BODY MASS INDEX: 16.13 KG/M2 | HEIGHT: 65 IN

## 2024-11-13 DIAGNOSIS — D23.62: Primary | ICD-10-CM

## 2024-11-13 PROCEDURE — 99213 OFFICE O/P EST LOW 20 MIN: CPT | Performed by: SURGERY

## 2024-11-13 RX ORDER — MULTIVITAMIN WITH IRON
1 TABLET ORAL DAILY
COMMUNITY

## 2024-11-16 ASSESSMENT — ENCOUNTER SYMPTOMS
VOMITING: 0
RHINORRHEA: 0
COUGH: 0
SHORTNESS OF BREATH: 0
ROS SKIN COMMENTS: SEE HPI.
NAUSEA: 0
ABDOMINAL PAIN: 0

## 2024-11-16 NOTE — PROGRESS NOTES
[facility] General Surgery   Derek Castro MD, FACS  Felicita Guy, APRN-CNP  [address], [address]  [place] [address]  P: [phone], F: [phone]    General and Robotic Surgery  Follow-Up Visit Note               PATIENT NAME: Tom Adams   :  2000   MRN: [id number]   PCP:  Mally Tomlin MD     TODAY'S DATE: 2024    Chief Complaint   Patient presents with    Post-Op Check     exc epidermal cyst left forearm 10/28           HISTORY OF PRESENT ILLNESS: 24 y.o. male presents for a follow-up visit.  Patient is status post excision of a lump on the left posterior forearm.  Wound is healing well.  No redness no drainage no fever chills.  Pathology results noted.    PAST MEDICAL HISTORY     Past Medical History:   Diagnosis Date    Prematurity     Short gut syndrome        PROBLEM LIST     Patient Active Problem List   Diagnosis    Epigastric pain    Chronic lumbosacral pain    Flatus    Slow transit constipation    Inflamed sebaceous cyst    Epidermal cyst       SURGICAL HISTORY       Past Surgical History:   Procedure Laterality Date    ARM SURGERY Left 10/28/2024    EXCISION EPIDERMAL CYST FOREARM LEFT performed by Derek Castro MD at [facility] OR    SMALL INTESTINE SURGERY      Had to have five surgeries        ALLERGIES   No Known Allergies    FAMILY HISTORY   family history is not on file.    SOCIAL HISTORY    reports that he has never smoked. He has never used smokeless tobacco. He reports that he does not drink alcohol and does not use drugs.    MEDICATIONS     Current Outpatient Medications:     Multiple Vitamin (MULTIVITAMIN) TABS tablet, Take 1 tablet by mouth daily, Disp: , Rfl:     cephALEXin (KEFLEX) 500 MG capsule, 500 mgTake three times daily (Patient not taking: Reported on 2024), Disp: 21 capsule, Rfl: 0       Review of Systems   Constitutional:  Negative for chills and fever.   HENT:  Negative for congestion and rhinorrhea.    Respiratory:

## (undated) DEVICE — GLOVE ORTHO 7 1/2   MSG9475

## (undated) DEVICE — SINGLE PORT MANIFOLD: Brand: NEPTUNE 2

## (undated) DEVICE — ST CHARLES HAND: Brand: MEDLINE INDUSTRIES, INC.

## (undated) DEVICE — TUBING SUCT 8FR MAL ALUM SHFT FN CAP VENT UNIV CONN W/ OBT

## (undated) DEVICE — NEPTUNE E-SEP SMOKE EVACUATION PENCIL, COATED, 70MM BLADE, PUSH BUTTON SWITCH: Brand: NEPTUNE E-SEP

## (undated) DEVICE — TUBING, SUCTION, 3/16" X 10', STRAIGHT: Brand: MEDLINE

## (undated) DEVICE — ELECTRODE PT RET AD L9FT HI MOIST COND ADH HYDRGEL CORDED